# Patient Record
Sex: FEMALE | Race: WHITE | URBAN - METROPOLITAN AREA
[De-identification: names, ages, dates, MRNs, and addresses within clinical notes are randomized per-mention and may not be internally consistent; named-entity substitution may affect disease eponyms.]

---

## 2022-11-21 ENCOUNTER — REFRACTIVE (OUTPATIENT)
Dept: URBAN - METROPOLITAN AREA CLINIC 37 | Facility: CLINIC | Age: 42
End: 2022-11-21

## 2022-11-21 DIAGNOSIS — H52.13 MYOPIA, BILATERAL: Primary | ICD-10-CM

## 2022-11-21 DIAGNOSIS — H35.051 RETINAL NEOVASCULARIZATION, UNSPECIFIED, RIGHT EYE: ICD-10-CM

## 2022-11-21 ASSESSMENT — INTRAOCULAR PRESSURE
OS: 19
OD: 19

## 2022-11-21 ASSESSMENT — KERATOMETRY
OS: 43.85
OD: 43.90

## 2022-11-21 ASSESSMENT — VISUAL ACUITY
OD: 20/20
OS: 20/20

## 2023-03-28 ENCOUNTER — OFFICE VISIT (OUTPATIENT)
Dept: URBAN - METROPOLITAN AREA CLINIC 51 | Facility: CLINIC | Age: 43
End: 2023-03-28
Payer: COMMERCIAL

## 2023-03-28 DIAGNOSIS — H35.071 RETINAL TELANGIECTASIS, RIGHT EYE: Primary | ICD-10-CM

## 2023-03-28 PROCEDURE — 99204 OFFICE O/P NEW MOD 45 MIN: CPT | Performed by: OPHTHALMOLOGY

## 2023-03-28 PROCEDURE — 92134 CPTRZ OPH DX IMG PST SGM RTA: CPT | Performed by: OPHTHALMOLOGY

## 2023-03-28 ASSESSMENT — INTRAOCULAR PRESSURE
OD: 13
OS: 13

## 2023-03-28 NOTE — IMPRESSION/PLAN
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 Plan: eval confirms telangiectasia at disc OD. no SRF, no CME, no tx indicated. ok to proceed with refractive procedure